# Patient Record
Sex: FEMALE | Race: WHITE
[De-identification: names, ages, dates, MRNs, and addresses within clinical notes are randomized per-mention and may not be internally consistent; named-entity substitution may affect disease eponyms.]

---

## 2018-03-29 ENCOUNTER — HOSPITAL ENCOUNTER (EMERGENCY)
Dept: HOSPITAL 47 - EC | Age: 48
Discharge: HOME | End: 2018-03-29
Payer: COMMERCIAL

## 2018-03-29 VITALS
SYSTOLIC BLOOD PRESSURE: 125 MMHG | RESPIRATION RATE: 16 BRPM | HEART RATE: 65 BPM | DIASTOLIC BLOOD PRESSURE: 78 MMHG | TEMPERATURE: 98.2 F

## 2018-03-29 DIAGNOSIS — Z79.899: ICD-10-CM

## 2018-03-29 DIAGNOSIS — Z20.2: Primary | ICD-10-CM

## 2018-03-29 DIAGNOSIS — F17.200: ICD-10-CM

## 2018-03-29 DIAGNOSIS — F32.9: ICD-10-CM

## 2018-03-29 LAB
HYALINE CASTS UR QL AUTO: 2 /LPF (ref 0–2)
PH UR: 5.5 [PH] (ref 5–8)
RBC UR QL: 1 /HPF (ref 0–5)
SP GR UR: 1.01 (ref 1–1.03)
SQUAMOUS UR QL AUTO: 6 /HPF (ref 0–4)
UROBILINOGEN UR QL STRIP: <2 MG/DL (ref ?–2)
WBC #/AREA URNS HPF: 2 /HPF (ref 0–5)

## 2018-03-29 PROCEDURE — 96372 THER/PROPH/DIAG INJ SC/IM: CPT

## 2018-03-29 PROCEDURE — 81001 URINALYSIS AUTO W/SCOPE: CPT

## 2018-03-29 PROCEDURE — 87491 CHLMYD TRACH DNA AMP PROBE: CPT

## 2018-03-29 PROCEDURE — 99284 EMERGENCY DEPT VISIT MOD MDM: CPT

## 2018-03-29 PROCEDURE — 87591 N.GONORRHOEAE DNA AMP PROB: CPT

## 2018-03-29 PROCEDURE — 87661 TRICHOMONAS VAGINALIS AMPLIF: CPT

## 2018-03-29 NOTE — ED
Female Urogenital HPI





- General


Chief complaint: Urogenital


Stated complaint: Female 


Time Seen by Provider: 03/29/18 12:56


Source: patient, RN notes reviewed, old records reviewed


Mode of arrival: ambulatory


Limitations: no limitations





- History of Present Illness


Initial comments: 





This patient is a 47-year-old female presents to the emergency department today 

to complete of concern for STD. She was informed by her partner that she could 

be exposed to an STD. Patient states that she has had no significant discharge 

or rashes. She reports that her last menstrual period was the beginning of 

March. She denies any pain with urination.Patient denies any of [place] pain, 

nausea, vomiting, chest pain, shortness of breath, headaches, fevers, chills.


Last Menstrual Period: 03/01/18





- Related Data


 Home Medications











 Medication  Instructions  Recorded  Confirmed


 


buPROPion [Wellbutrin] 150 mg PO DAILY 07/08/14 03/29/18


 


ALPRAZolam [Xanax] 0.25 mg PO TID PRN 12/01/15 03/29/18


 


DULoxetine HCL [Cymbalta] 60 mg PO DAILY 12/01/15 03/29/18


 


Ergocalciferol [Vitamin D2] 50,000 unit PO Q14D 03/29/18 03/29/18


 


Ferrous Sulfate [Iron (65  mg PO TID 03/29/18 03/29/18





Elemental)]   


 


Ibuprofen [Ibuprofen] 800 mg PO BID PRN 03/29/18 03/29/18


 


Oxybutynin Chloride 5 mg PO BID 03/29/18 03/29/18








 Previous Rx's











 Medication  Instructions  Recorded


 


HYDROcodone/APAP 7.5-325MG [Norco 1 tab PO Q6HR PRN #20 tab 07/08/14





7.5]  











 Allergies











Allergy/AdvReac Type Severity Reaction Status Date / Time


 


No Known Allergies Allergy   Verified 03/29/18 11:54














Review of Systems


ROS Statement: 


Those systems with pertinent positive or pertinent negative responses have been 

documented in the HPI.





ROS Other: All systems not noted in ROS Statement are negative.





Past Medical History


Past Medical History: No Reported History


History of Any Multi-Drug Resistant Organisms: None Reported


Past Surgical History: Tubal Ligation


Past Psychological History: Depression


Smoking Status: Current every day smoker


Past Alcohol Use History: Occasional


Past Drug Use History: Marijuana





General Exam





- General Exam Comments


Initial Comments: 





47-year-old female.Very anxious and distraught.


Limitations: no limitations


General appearance: alert, in no apparent distress


Head exam: Present: atraumatic, normocephalic, normal inspection


Eye exam: Present: normal appearance, PERRL, EOMI.  Absent: scleral icterus, 

conjunctival injection, periorbital swelling


ENT exam: Present: normal exam, mucous membranes moist


Neck exam: Present: normal inspection.  Absent: tenderness, meningismus, 

lymphadenopathy


Respiratory exam: Present: normal lung sounds bilaterally.  Absent: respiratory 

distress, wheezes, rales, rhonchi, stridor


Cardiovascular Exam: Present: regular rate, normal rhythm, normal heart sounds.

  Absent: systolic murmur, diastolic murmur, rubs, gallop, clicks


GI/Abdominal exam: Present: soft, normal bowel sounds.  Absent: distended, 

tenderness, guarding, rebound, rigid


External exam: Present: normal external exam


Speculum exam: Present: normal speculum exam.  Absent: erythema, vaginal 

discharge, cervical discharge, vaginal bleeding


By manual exam: Present: normal by manual exam.  Absent: cervical motion 

tenderness, adnexal tenderness


Extremities exam: Present: normal inspection, full ROM, normal capillary 

refill.  Absent: tenderness, pedal edema, joint swelling, calf tenderness


Neurological exam: Present: alert, oriented X3, CN II-XII intact


Psychiatric exam: Present: normal affect, normal mood


Skin exam: Present: warm, dry, intact, normal color.  Absent: rash





Course


 Vital Signs











  03/29/18 03/29/18





  11:54 14:19


 


Temperature 99.6 F 98.2 F


 


Pulse Rate 104 H 65


 


Respiratory 20 16





Rate  


 


Blood Pressure 178/91 125/78


 


O2 Sat by Pulse 100 





Oximetry  














Medical Decision Making





- Medical Decision Making


This patient is a 47-year-old female presents to the emergency department today 

to complete of concern for STD. She was informed by her partner that she could 

be exposed to an STD. Patient states that she has had no significant discharge 

or rashes. She reports that her last menstrual period was the beginning of 

March.You should have a normal pelvic exam. She has no rashes or other concern 

for STD. Discussed we will do swabs and cultures. Will treat the patient for 

chlamydia gonorrhea trichomonas. Patient was given rocephin, azithromycin and 

Flagyl. Discuss follow up with gynocologist. All questions answered and return 

parameters were discussed.








- Lab Data


 Lab Results











  03/29/18 Range/Units





  11:58 


 


Urine Color  Yellow  


 


Urine Appearance  Cloudy H  (Clear)  


 


Urine pH  5.5  (5.0-8.0)  


 


Ur Specific Gravity  1.011  (1.001-1.035)  


 


Urine Protein  Negative  (Negative)  


 


Urine Glucose (UA)  Negative  (Negative)  


 


Urine Ketones  Negative  (Negative)  


 


Urine Blood  Negative  (Negative)  


 


Urine Nitrite  Negative  (Negative)  


 


Urine Bilirubin  Negative  (Negative)  


 


Urine Urobilinogen  <2.0  (<2.0)  mg/dL


 


Ur Leukocyte Esterase  Moderate H  (Negative)  


 


Urine RBC  1  (0-5)  /hpf


 


Urine WBC  2  (0-5)  /hpf


 


Ur Squamous Epith Cells  6 H  (0-4)  /hpf


 


Urine Bacteria  Rare H  (None)  /hpf


 


Hyaline Casts  2  (0-2)  /lpf


 


Urine Mucus  Few H  (None)  /hpf














Disposition


Clinical Impression: 


 STD exposure





Disposition: HOME SELF-CARE


Condition: Good


Instructions:  Sexually Transmitted Diseases (ED)


Additional Instructions: 


Patient advised to rest.  Inform all sexual partners of possible exposure.  

Patient should return to the emergency department if any alarming signs or 

symptoms occur.  We will follow up with fevers any positive culture results.


Referrals: 


Fermín Foreman MD [Primary Care Provider] - 1-2 days


Time of Disposition: 13:43

## 2018-03-30 NOTE — CDI
Dear Luisa PA:



Please do addendum to ED report for HPI , Physical exam and MDM.



Thank you,   

Maria E Lantigua





If you have any question, Please contact  at [phone]
[facility]

## 2018-05-11 ENCOUNTER — HOSPITAL ENCOUNTER (EMERGENCY)
Dept: HOSPITAL 47 - EC | Age: 48
Discharge: HOME | End: 2018-05-11
Payer: COMMERCIAL

## 2018-05-11 VITALS
RESPIRATION RATE: 22 BRPM | HEART RATE: 119 BPM | DIASTOLIC BLOOD PRESSURE: 80 MMHG | TEMPERATURE: 97.5 F | SYSTOLIC BLOOD PRESSURE: 150 MMHG
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
## 2018-05-11 DIAGNOSIS — F10.129: ICD-10-CM
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
## 2018-05-11 DIAGNOSIS — Z79.899: ICD-10-CM
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
## 2018-05-11 DIAGNOSIS — R41.82: ICD-10-CM

## 2018-05-11 DIAGNOSIS — R45.1: ICD-10-CM

## 2018-05-11 DIAGNOSIS — F32.9: ICD-10-CM

## 2018-05-11 DIAGNOSIS — F41.9: ICD-10-CM

## 2018-05-11 DIAGNOSIS — F17.200: ICD-10-CM

## 2018-05-11 DIAGNOSIS — M54.2: ICD-10-CM

## 2018-05-11 DIAGNOSIS — S01.01XA: Primary | ICD-10-CM

## 2018-05-11 PROCEDURE — 70450 CT HEAD/BRAIN W/O DYE: CPT

## 2018-05-11 PROCEDURE — 72125 CT NECK SPINE W/O DYE: CPT

## 2018-05-11 PROCEDURE — 99284 EMERGENCY DEPT VISIT MOD MDM: CPT

## 2018-05-11 PROCEDURE — 82075 ASSAY OF BREATH ETHANOL: CPT

## 2018-05-11 PROCEDURE — 12002 RPR S/N/AX/GEN/TRNK2.6-7.5CM: CPT
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
## 2018-05-11 NOTE — CT
EXAM:

  CT Head Without Intravenous Contrast

 

CLINICAL HISTORY:

  : Pain

 

TECHNIQUE:

  Axial computed tomography images of the head/brain without intravenous 

contrast.  CTDI is  60.3 mGy and DLP is 1126.5 mGy-cm.  This CT exam was 

performed using one or more of the following dose reduction techniques: 

automated exposure control, adjustment of the mA and/or kV according to 

patient size, and/or use of iterative reconstruction technique.

 

COMPARISON:

  No relevant prior studies available.

 

FINDINGS:

  Brain:  Unremarkable.  No hemorrhage.  No significant white matter 

disease.  No edema.

  Ventricles:  Unremarkable.  No ventriculomegaly.

  Bones/joints:  Unremarkable.  No acute fracture.

  Soft tissues:  Unremarkable.

  Sinuses: Minimal mucosal thickening noted in ethmoid air cells.

  Mastoid air cells:  Unremarkable as visualized.  No mastoid effusion.

 

IMPRESSION:     

No focal intracranial lesion.  Minimal mucosal thickening in ethmoid air 

cells bilaterally

 

_______________________________________________

 

EXAM:

  CT Cervical Spine Without Intravenous Contrast

 

CLINICAL HISTORY:

Pain

 

TECHNIQUE:

  Axial computed tomography images of the cervical spine without 

intravenous contrast.  CTDI is 19.3 mGy and DLP is  387.5 mGy-cm.  This 

CT exam was performed using one or more of the following dose reduction 

techniques: automated exposure control, adjustment of the mA and/or kV 

according to patient size, and/or use of iterative reconstruction 

technique.

  Coronal and sagittal reformatted images were created and reviewed.

 

COMPARISON:

  No relevant prior studies available.

 

FINDINGS:

  Vertebrae:  Unremarkable.  No acute fracture.  

 

  Discs/spinal canal/neural foramina:  No acute findings.  Mild 

degenerative changes with disc space narrowing at C45 C5-6-6 7

  Soft tissues:  Unremarkable.

  Lung apices:  Unremarkable as visualized.

 

IMPRESSION:     

Mild degenerative changes no evidence for acute fracture or malalignment

## 2018-08-21 ENCOUNTER — HOSPITAL ENCOUNTER (OUTPATIENT)
Dept: HOSPITAL 47 - RADMRIMAIN | Age: 48
Discharge: HOME | End: 2018-08-21
Attending: PSYCHIATRY & NEUROLOGY
Payer: COMMERCIAL

## 2018-08-21 DIAGNOSIS — M51.26: ICD-10-CM

## 2018-08-21 DIAGNOSIS — M51.36: ICD-10-CM

## 2018-08-21 DIAGNOSIS — M43.16: ICD-10-CM

## 2018-08-21 DIAGNOSIS — M46.96: ICD-10-CM

## 2018-08-21 DIAGNOSIS — M99.73: Primary | ICD-10-CM

## 2018-08-21 PROCEDURE — 72148 MRI LUMBAR SPINE W/O DYE: CPT

## 2018-08-21 NOTE — MR
EXAMINATION TYPE: MR lumbar spine wo con

 

DATE OF EXAM: 8/21/2018

 

COMPARISON: NONE

 

HISTORY: Low back pain

 

TECHNIQUE: T1 and T2  axial and sagittal images of the lumbar spine are submitted.  

 

FINDINGS: There is no abnormal signal seen within the visualized spinal cord or paraspinal soft tissu
es.

 

At L1-2 there is hypertrophic change of the facets. No disc herniation or canal stenosis. No foramina
l encroachment.

 

At L2-3 there is set arthropathy and hypertrophic changes of the ligamentum flavum. Mild circumferent
ial disc bulging. No Canal stenosis. Mild bilateral foraminal encroachment.

 

At L3-4 there is no disc herniation or canal stenosis. Mild hypertrophic change of the facets.

 

At L4-5 there is grade 1 anterolisthesis with central disc protrusion resulting in moderate central s
tenosis. There is severe facet arthropathy and mild to moderate bilateral foraminal encroachment.

 

At L5-S1 there is no disc herniation or canal stenosis. There is facet arthropathy. No foraminal encr
oachment.

 

 

IMPRESSION:

1. Degenerative disc disease with severe facet arthropathy L4-L5 resulting in grade 1 anterolisthesis
. Central disc protrusion contributes to moderate canal stenosis and mild bilateral foraminal encroac
hment.

## 2024-10-10 ENCOUNTER — HOSPITAL ENCOUNTER (OUTPATIENT)
Dept: HOSPITAL 47 - RADMAMWWP | Age: 54
Discharge: HOME | End: 2024-10-10
Attending: FAMILY MEDICINE
Payer: COMMERCIAL

## 2024-10-10 PROCEDURE — 77063 BREAST TOMOSYNTHESIS BI: CPT

## 2024-10-10 PROCEDURE — 77067 SCR MAMMO BI INCL CAD: CPT

## 2024-10-14 NOTE — MM
Reason for Exam: Screening  (asymptomatic). 

Baseline mammogram.





Patient History: 

Menarche at age 18. First Full-Term Pregnancy at age 20. Postmenopausal. 





Risk Values: 

Priyanka 5 year model risk: 0.7%.

NCI Lifetime model risk: 5.8%.





Prior Study Comparison: 

Patient's first Mammogram. 





Tissue Density: 

There are scattered areas of fibroglandular density.





Findings: 

Analyzed By CAD. 

Right breast: There is no suspicious group of microcalcifications or new suspicious mass.



Left breast: There is no suspicious group of microcalcifications or new suspicious mass. 





Overall Assessment: Negative, BI-RAD 1





Management: 

Screening Mammogram of both breasts in 1 year.

[facility] will attempt to contact patient to return for supplemental views and

ultrasound if indicated.



Patient should continue monthly self-breast exams.  A clinical breast exam by your physician is

recommended on an annual basis.

This exam should not preclude additional follow-up of suspicious palpable abnormalities.



Note on Priyanka scores and lifetime risk:

1. A Priyanka score greater than 3% is considered moderate risk. If this is the case, consider

specialist referral to assess eligibility for a risk reducing agent.

2. If overall lifetime risk for the development of breast cancer is 20% or higher, the patient may

qualify for future screening with alternating mammogram and breast MRI.



[facility], Workstation: [id number], 10/14/2024 8:46 AM.



Electronically signed and approved by: Kwabena Estrada DO